# Patient Record
Sex: MALE | Race: WHITE | NOT HISPANIC OR LATINO | Employment: FULL TIME | URBAN - NONMETROPOLITAN AREA
[De-identification: names, ages, dates, MRNs, and addresses within clinical notes are randomized per-mention and may not be internally consistent; named-entity substitution may affect disease eponyms.]

---

## 2017-09-07 ENCOUNTER — OFFICE VISIT (OUTPATIENT)
Dept: FAMILY MEDICINE CLINIC | Facility: CLINIC | Age: 57
End: 2017-09-07

## 2017-09-07 VITALS
HEIGHT: 73 IN | OXYGEN SATURATION: 98 % | RESPIRATION RATE: 16 BRPM | SYSTOLIC BLOOD PRESSURE: 148 MMHG | DIASTOLIC BLOOD PRESSURE: 92 MMHG | HEART RATE: 75 BPM | BODY MASS INDEX: 41.75 KG/M2 | TEMPERATURE: 97.9 F | WEIGHT: 315 LBS

## 2017-09-07 DIAGNOSIS — R03.0 ELEVATED BLOOD PRESSURE READING WITHOUT DIAGNOSIS OF HYPERTENSION: ICD-10-CM

## 2017-09-07 DIAGNOSIS — R10.9 STOMACH PAIN: Primary | ICD-10-CM

## 2017-09-07 DIAGNOSIS — Z28.21 INFLUENZA VACCINATION DECLINED: ICD-10-CM

## 2017-09-07 PROCEDURE — 99203 OFFICE O/P NEW LOW 30 MIN: CPT | Performed by: FAMILY MEDICINE

## 2017-09-07 RX ORDER — SUCRALFATE 1 G/1
1 TABLET ORAL 4 TIMES DAILY
Qty: 40 TABLET | Refills: 1 | Status: SHIPPED | OUTPATIENT
Start: 2017-09-07 | End: 2017-09-23 | Stop reason: SDUPTHER

## 2017-09-07 RX ORDER — OMEPRAZOLE 20 MG/1
20 CAPSULE, DELAYED RELEASE ORAL DAILY
Qty: 14 CAPSULE | Refills: 0 | Status: SHIPPED | OUTPATIENT
Start: 2017-09-07

## 2017-09-07 NOTE — PROGRESS NOTES
Chief Complaint   Patient presents with   • GI Problem        History:  Jarret Bianchi is a 57 y.o. male presents who today for evaluation of the above problems.  PCP currently listed as No Known Provider.   Patient presents with GI issues, that just has started in the past week. Pain is in upper quadrant of abdomen. He feels like certain foods make it worse. He does have some diarrhea with some episodes. Symptoms present since Sunday,now 4 days.  He had cut out greasy foods, fatty foods, sugar foods.  Lost ~40 lb over 6-12 months and then He resumed this diet again over past 3-4 months.  He is a , has cut out a lot of bad foods.  Started working harder.  He started caffeine again, hot dogs/fatty foods and then started having stomach problems again.  Diarrhea x 2-3 days.  This am solid not diarrhea.  Yesterday prominent sweating, prominent Gi issues, cramping.  Did not bother him through night, did not have dinner and this am he feels somewhat better as he has not had breakfast yet.  He has a fullness this am.   Emesis  X 1 Monday pm.  Ate a lot of pizza.  The patient feels fine now. BP is mildly elevated, weight is prominently elevated. He is having intermittent cramping.  He notes abdominal pain starts middle of stomach and moves to RUQ or LUQ.  No Nasuea today. NO food today.  Drinking liquids okay.  He is having belching at times, no sour taste, no GERD symptoms.  No diarrhea in 24 hours.  After further discussion he had a large chocolate milkshake from [employer] on Tuesday and he had prominent cramping and diarrhea yesterday. I discussed if Gastroenteritis was present he can be temporarily lactose intolerant. Bp is up today.  DIscussed weight, discussed HTN.  He has no history.     Jarret Bianchi  has no past medical history on file.    No Known Allergies  History reviewed. No pertinent past medical history.  Past Surgical History:   Procedure Laterality Date   • LACERATION REPAIR Right     knee     Family  "History   Problem Relation Age of Onset   • Cancer Mother        No current outpatient prescriptions on file prior to visit.     No current facility-administered medications on file prior to visit.        Family history, surgical history, past medical history, Allergies and meds reviewed with patient today and updated in [facility] EMR.     ROS:  Review of Systems   Constitutional: Positive for fatigue. Negative for activity change, appetite change, chills, diaphoresis and fever.   HENT: Negative for congestion, ear discharge, ear pain, facial swelling, hearing loss, rhinorrhea, sinus pressure, sneezing, sore throat and tinnitus.    Eyes: Negative for pain, discharge, redness and visual disturbance.   Respiratory: Negative for apnea, cough, choking, chest tightness, shortness of breath and wheezing.    Cardiovascular: Negative for chest pain, palpitations and leg swelling.   Gastrointestinal: Positive for abdominal pain, diarrhea, nausea and vomiting. Negative for abdominal distention, anal bleeding, blood in stool, constipation and rectal pain.   Genitourinary: Negative for difficulty urinating, flank pain, frequency, penile pain and urgency.   Musculoskeletal: Negative for arthralgias, back pain, gait problem, joint swelling, myalgias and neck pain.   Skin: Negative for color change, pallor and rash.   Allergic/Immunologic: Negative for environmental allergies and food allergies.   Neurological: Negative for dizziness, seizures, weakness, numbness and headaches.   Hematological: Negative for adenopathy. Does not bruise/bleed easily.   Psychiatric/Behavioral: Negative for agitation, behavioral problems, confusion, hallucinations, self-injury, sleep disturbance and suicidal ideas.       OBJECTIVE:  Vitals:    09/07/17 0725   BP: 148/92   Pulse: 75   Resp: 16   Temp: 97.9 °F (36.6 °C)   SpO2: 98%   Weight: (!) 336 lb (152 kg)   Height: 73\" (185.4 cm)     Physical Exam   Constitutional: He is oriented to person, place, and " time. He appears well-developed and well-nourished.    Morbid obesity w/ central adiposity.   HENT:   Head: Normocephalic and atraumatic.   Right Ear: External ear normal.   Left Ear: External ear normal.   Nose: Nose normal.   Mouth/Throat: Oropharynx is clear and moist.   Eyes: Conjunctivae and EOM are normal. Pupils are equal, round, and reactive to light.   Neck: Normal range of motion. Neck supple. No thyromegaly present.   Cardiovascular: Normal rate, regular rhythm, normal heart sounds and intact distal pulses.    No murmur heard.  Pulmonary/Chest: Effort normal and breath sounds normal. No respiratory distress. He has no wheezes. He exhibits no tenderness.   Abdominal: Soft. Bowel sounds are normal. There is tenderness in the right upper quadrant and epigastric area. There is no rebound and no guarding.   Musculoskeletal: He exhibits no tenderness.        Right hip: He exhibits decreased range of motion. He exhibits normal strength, no tenderness and no bony tenderness.        Left hip: He exhibits decreased range of motion. He exhibits normal strength, no tenderness and no bony tenderness.        Thoracic back: Normal. He exhibits normal range of motion, no tenderness, no pain and no spasm.        Lumbar back: Normal. He exhibits normal range of motion, no tenderness, no pain and no spasm.   Lymphadenopathy:     He has no cervical adenopathy.   Neurological: He is alert and oriented to person, place, and time. No cranial nerve deficit. Coordination normal.   Skin: Skin is warm and dry. No rash noted.   Psychiatric: He has a normal mood and affect. His behavior is normal. Judgment and thought content normal.   Nursing note and vitals reviewed.      Assessment/Plan:  Stomach Pain: Ddx: Resolving Gastroenteritis, IBS, Gastritis, Colitis, less likely food poisoning and or Biliary disease.  Discussed lactose intolerance. Discussed case with patient/family today and feel that most likely dx is gastroenteritis.   These usually run anywhere from 1-5 days depending on severity. Biggest risk factor is dehydration. Gatoraid is best rehydration solution. Encouraged any color but red.  No bloody emesis/diarrhea by history. Mild cramping, mild abdominal pain.  May consider avoiding dairy as able for ~ 1 week as GE can cause temporary lactose intolerance. Discussed to consider UA today.  If no better in 1 week or if worsening recommend followup with me to evaluate this further.  If concerned about dehydration can follow-up with me. Risks/benefits of current and new medications discussed with the patient/family today.  The patient/family are aware if there any side effects they should stop meds and call or return to clinic.  Appropriate F/U discussed. All questions answered to satisfactory state of patient/family.  Should symptoms fail to improve or worsen discussed to call or return to clinic or go to ER. Education handout provided on any new Rx. Avoid dairy x 1 week.   Next step includes RUMATEO US.        Obesity: Federal guidelines recommend that people under the age of 65 should have a BMI of 18.5-24.9 and people age 65 and older should have a BMI of 23-30. Morbid obesity is defined as >100 lb overweight or BMI >40.  The patient BMI is outside the recommended range and we recommended/discussed today to utilize a diet/exercise program to get back into the appropriate range.  Today we encouraged roughly a 1 lb per week weight loss with initial goal of 5% weight loss. The initial step is to document everything that is consumed into a food diary.  Studies have shown that patients can lose up to 2x the weight by keeping track of foods.  We discussed BEE today and discussed reasonable goal to realize weight loss.    · Discussed if eating out for a meal, consider cutting food in half and placing into to-go container.  Individually portion any foods coming into the home based on package.   · Consider using smaller plates.    · Consider  drinking 12 oz of water 30 minutes before meal as way to suppress appetite.   · Cut back on soft drinks/juices.  Discussed 1 can of regular soft drink has roughly 150-170 Calories per day.    · Increase exercise as able. It is recommended to try to exercise minimum 10 minutes 5 days per week.  The goal over the next 2-4 weeks is to walk 30 minutes per day 5 days per week at pace difficult to hold conversation.   · Medications that may provide some benefit to weight loss include metformin, topamax, phentermine, Qsymia, Belviq (lorcaserin), Contrave (Buproprion/naltrexone) and a few others for Binge eating.  Also discussed some of the FAD diets and recommended general portion reduction instead of special dietary changes.  · Apps that may be of benefit include [employer] Pal, Lose it.  · Healthier choices included fruits/grains/nuts instead of process food.    Elevated Blood pressure: History of HTN no. Elevated BP today above goal. Discussed some readily available medications/supplements can increase BP: NSAIDS, caffeine, decongestants, nicotine.  Additionally stress, illegal drugs/substances, Pain and anxiety/white coat syndrome can increase BP.  Close monitoring encouraged.  Recommended keep log and bring back to office in next few weeks to see if this is isolated or a trend.   · Monitor for next 1-2 weeks.    · If you have a home cuff check daily when you awaken. Best if seated 10-15 minutes back supported.    · If >140/90 rest back supported, arms/legs uncrossed and repeat reading 15 minutes later.  · If no cuff, check at pharmacy or in our office.   · F/u in 2-4 weeks for repeat BP evaluation.  Bring in BP cuff and or log to compare.  · Goal <140/90. If BP is worsening call or f/u in the office.     Orders Placed Today:  Jarret was seen today for gi problem.    Diagnoses and all orders for this visit:    Stomach pain  -     omeprazole (PRILOSEC) 20 MG capsule; Take 1 capsule by mouth Daily.  -     sucralfate  (CARAFATE) 1 g tablet; Take 1 tablet by mouth 4 (Four) Times a Day.    Influenza vaccination declined    Elevated blood pressure reading without diagnosis of hypertension        Risks/benefits of current and new medications discussed with the patient and or family today.  The patient/family are aware and accept that if there any side effects they should call or return to clinic as soon as possible.  Appropriate F/U discussed for topics addressed today. All questions were answered to the satisfactory state of patient/family.  Should symptoms fail to improve or worsen they agree to call or return to clinic or to go to the ER. Education handouts were offered on any new Rx if requested.  Discussed the importance of following up with any needed screening tests/labs/specialist appointments and any requested follow-up recommended by me today.  Importance of maintaining follow-up discussed and patient accepts that missed appointments can delay diagnosis and potentially lead to worsening of conditions.    An After Visit Summary was printed and given to the patient at discharge.  Follow-up: Return in about 1 week (around 9/14/2017), or if symptoms worsen or fail to improve.         Baldev Birmingham M.D. 9/7/2017

## 2017-09-07 NOTE — PATIENT INSTRUCTIONS
"DASH Eating Plan  DASH stands for \"Dietary Approaches to Stop Hypertension.\" The DASH eating plan is a healthy eating plan that has been shown to reduce high blood pressure (hypertension). Additional health benefits may include reducing the risk of type 2 diabetes mellitus, heart disease, and stroke. The DASH eating plan may also help with weight loss.  WHAT DO I NEED TO KNOW ABOUT THE DASH EATING PLAN?  For the DASH eating plan, you will follow these general guidelines:  · Choose foods with less than 150 milligrams of sodium per serving (as listed on the food label).  · Use salt-free seasonings or herbs instead of table salt or sea salt.  · Check with your health care provider or pharmacist before using salt substitutes.  · Eat lower-sodium products. These are often labeled as \"low-sodium\" or \"no salt added.\"  · Eat fresh foods. Avoid eating a lot of canned foods.  · Eat more vegetables, fruits, and low-fat dairy products.  · Choose whole grains. Look for the word \"whole\" as the first word in the ingredient list.  · Choose fish and skinless chicken or turkey more often than red meat. Limit fish, poultry, and meat to 6 oz (170 g) each day.  · Limit sweets, desserts, sugars, and sugary drinks.  · Choose heart-healthy fats.  · Eat more home-cooked food and less restaurant, buffet, and fast food.  · Limit fried foods.  · Do not ortega foods. Cook foods using methods such as baking, boiling, grilling, and broiling instead.  · When eating at a restaurant, ask that your food be prepared with less salt, or no salt if possible.  WHAT FOODS CAN I EAT?  Seek help from a dietitian for individual calorie needs.  Grains  Whole grain or whole wheat bread. Brown rice. Whole grain or whole wheat pasta. Quinoa, bulgur, and whole grain cereals. Low-sodium cereals. Corn or whole wheat flour tortillas. Whole grain cornbread. Whole grain crackers. Low-sodium crackers.  Vegetables  Fresh or frozen vegetables (raw, steamed, roasted, or " grilled). Low-sodium or reduced-sodium tomato and vegetable juices. Low-sodium or reduced-sodium tomato sauce and paste. Low-sodium or reduced-sodium canned vegetables.   Fruits  All fresh, canned (in natural juice), or frozen fruits.  Meat and Other Protein Products  Ground beef (85% or leaner), grass-fed beef, or beef trimmed of fat. Skinless chicken or turkey. Ground chicken or turkey. Pork trimmed of fat. All fish and seafood. Eggs. Dried beans, peas, or lentils. Unsalted nuts and seeds. Unsalted canned beans.  Dairy  Low-fat dairy products, such as skim or 1% milk, 2% or reduced-fat cheeses, low-fat ricotta or cottage cheese, or plain low-fat yogurt. Low-sodium or reduced-sodium cheeses.  Fats and Oils  Tub margarines without trans fats. Light or reduced-fat mayonnaise and salad dressings (reduced sodium). Avocado. Safflower, olive, or canola oils. Natural peanut or almond butter.  Other  Unsalted popcorn and pretzels.  The items listed above may not be a complete list of recommended foods or beverages. Contact your dietitian for more options.  WHAT FOODS ARE NOT RECOMMENDED?  Grains  White bread. White pasta. White rice. Refined cornbread. Bagels and croissants. Crackers that contain trans fat.  Vegetables  Creamed or fried vegetables. Vegetables in a cheese sauce. Regular canned vegetables. Regular canned tomato sauce and paste. Regular tomato and vegetable juices.  Fruits  Canned fruit in light or heavy syrup. Fruit juice.  Meat and Other Protein Products  Fatty cuts of meat. Ribs, chicken wings, diego, sausage, bologna, salami, chitterlings, fatback, hot dogs, bratwurst, and packaged luncheon meats. Salted nuts and seeds. Canned beans with salt.  Dairy  Whole or 2% milk, cream, half-and-half, and cream cheese. Whole-fat or sweetened yogurt. Full-fat cheeses or blue cheese. Nondairy creamers and whipped toppings. Processed cheese, cheese spreads, or cheese curds.  Condiments  Onion and garlic salt, seasoned  salt, table salt, and sea salt. Canned and packaged gravies. Worcestershire sauce. Tartar sauce. Barbecue sauce. Teriyaki sauce. Soy sauce, including reduced sodium. Steak sauce. Fish sauce. Oyster sauce. Cocktail sauce. Horseradish. Ketchup and mustard. Meat flavorings and tenderizers. Bouillon cubes. Hot sauce. Tabasco sauce. Marinades. Taco seasonings. Relishes.  Fats and Oils  Butter, stick margarine, lard, shortening, ghee, and diego fat. Coconut, palm kernel, or palm oils. Regular salad dressings.  Other  Pickles and olives. Salted popcorn and pretzels.  The items listed above may not be a complete list of foods and beverages to avoid. Contact your dietitian for more information.  WHERE CAN I FIND MORE INFORMATION?  National Heart, Lung, and Blood [place]: www.nhlbi.nih.gov/health/health-topics/topics/dash/     This information is not intended to replace advice given to you by your health care provider. Make sure you discuss any questions you have with your health care provider.     Document Released: 12/06/2012 Document Revised: 04/10/2017 Document Reviewed: 10/22/2014  [employer] Interactive Patient Education ©2017 [employer]  Diet for Irritable Bowel Syndrome  When you have irritable bowel syndrome (IBS), the foods you eat and your eating habits are very important. IBS may cause various symptoms, such as abdominal pain, constipation, or diarrhea. Choosing the right foods can help ease discomfort caused by these symptoms. Work with your health care provider and dietitian to find the best eating plan to help control your symptoms.  WHAT GENERAL GUIDELINES DO I NEED TO FOLLOW?  · Keep a food diary. This will help you identify foods that cause symptoms. Write down:    What you eat and when.    What symptoms you have.    When symptoms occur in relation to your meals.  · Avoid foods that cause symptoms. Talk with your dietitian about other ways to get the same nutrients that are in these foods.  · Eat more  foods that contain fiber. Take a fiber supplement if directed by your dietitian.  · Eat your meals slowly, in a relaxed setting.  · Aim to eat 5-6 small meals per day. Do not skip meals.  · Drink enough fluids to keep your urine clear or pale yellow.  · Ask your health care provider if you should take an over-the-counter probiotic during flare-ups to help restore healthy gut bacteria.  · If you have cramping or diarrhea, try making your meals low in fat and high in carbohydrates. Examples of carbohydrates are pasta, rice, whole grain breads and cereals, fruits, and vegetables.  · If dairy products cause your symptoms to flare up, try eating less of them. You might be able to handle yogurt better than other dairy products because it contains bacteria that help with digestion.  WHAT FOODS ARE NOT RECOMMENDED?  The following are some foods and drinks that may worsen your symptoms:  · Fatty foods, such as French fries.  · Milk products, such as cheese or ice cream.  · Chocolate.  · Alcohol.  · Products with caffeine, such as coffee.  · Carbonated drinks, such as soda.  The items listed above may not be a complete list of foods and beverages to avoid. Contact your dietitian for more information.  WHAT FOODS ARE GOOD SOURCES OF FIBER?  Your health care provider or dietitian may recommend that you eat more foods that contain fiber. Fiber can help reduce constipation and other IBS symptoms. Add foods with fiber to your diet a little at a time so that your body can get used to them. Too much fiber at once might cause gas and swelling of your abdomen. The following are some foods that are good sources of fiber:  · Apples.  · Peaches.  · Pears.  · Berries.  · Figs.  · Broccoli (raw).  · Cabbage.  · Carrots.  · Raw peas.  · Kidney beans.  · Lima beans.  · Whole grain bread.  · Whole grain cereal.  FOR MORE INFORMATION   International Foundation for Functional Gastrointestinal Disorders: www.iffgd.org  National [place] of  Diabetes and Digestive and Kidney Diseases: www.niddk.nih.gov/health-information/health-topics/digestive-diseases/ibs/Pages/facts.aspx     This information is not intended to replace advice given to you by your health care provider. Make sure you discuss any questions you have with your health care provider.     Document Released: 03/09/2005 Document Revised: 01/08/2016 Document Reviewed: 03/20/2015  [employer] Interactive Patient Education ©2017 [employer]

## 2017-09-23 DIAGNOSIS — R10.9 STOMACH PAIN: ICD-10-CM

## 2017-09-25 RX ORDER — SUCRALFATE 1 G/1
TABLET ORAL
Qty: 40 TABLET | Refills: 0 | Status: SHIPPED | OUTPATIENT
Start: 2017-09-25

## 2019-11-21 ENCOUNTER — OFFICE VISIT (OUTPATIENT)
Dept: FAMILY MEDICINE CLINIC | Facility: CLINIC | Age: 59
End: 2019-11-21

## 2019-11-21 VITALS
RESPIRATION RATE: 18 BRPM | OXYGEN SATURATION: 98 % | HEIGHT: 72 IN | DIASTOLIC BLOOD PRESSURE: 80 MMHG | SYSTOLIC BLOOD PRESSURE: 132 MMHG | TEMPERATURE: 97.9 F | HEART RATE: 82 BPM | WEIGHT: 315 LBS | BODY MASS INDEX: 42.66 KG/M2

## 2019-11-21 DIAGNOSIS — B34.9 VIRAL ILLNESS: Primary | ICD-10-CM

## 2019-11-21 PROCEDURE — 99213 OFFICE O/P EST LOW 20 MIN: CPT | Performed by: FAMILY MEDICINE

## 2019-11-21 NOTE — PROGRESS NOTES
Subjective cc: nausea   Jarret Bianchi is a 59 y.o. male who presents with not feeling well. He went ot grandparents day this AM, when he got back to work he broke up in sweats and started getting nausea. No diarrhea.  He is having dry mouth and then salvia. No chest pain, he feels like his breathing rate was increased and shallow. Others ate same food - no one else with symptoms that he is aware of.     His granddaughter was sick recently - had nausea and headache and stayed with them recently - 24 hours ago.     Nausea   This is a new problem. The current episode started today. The problem occurs constantly. The problem has been unchanged. Associated symptoms include nausea. Pertinent negatives include no abdominal pain, anorexia, arthralgias, change in bowel habit, chest pain, chills, congestion, coughing, diaphoresis, fatigue, fever, headaches, joint swelling, myalgias, neck pain, numbness, rash, sore throat, swollen glands, urinary symptoms, vertigo, visual change, vomiting or weakness. Nothing aggravates the symptoms. He has tried nothing for the symptoms. The treatment provided no relief.        The following portions of the patient's history were reviewed and updated as appropriate: allergies, current medications, past family history, past medical history, past social history, past surgical history and problem list.        Review of Systems   Constitutional: Positive for appetite change. Negative for activity change, chills, diaphoresis, fatigue, fever and unexpected weight change.   HENT: Negative for congestion and sore throat.    Respiratory: Negative for cough.    Cardiovascular: Negative for chest pain.   Gastrointestinal: Positive for nausea. Negative for abdominal pain, anorexia, blood in stool, change in bowel habit, constipation and vomiting.   Musculoskeletal: Negative for arthralgias, joint swelling, myalgias and neck pain.   Skin: Negative for rash.   Neurological: Negative for vertigo,  "weakness, numbness and headaches.   All other systems reviewed and are negative.      Objective   Blood pressure 132/80, pulse 82, temperature 97.9 °F (36.6 °C), temperature source Oral, resp. rate 18, height 182.9 cm (72\"), weight (!) 159 kg (351 lb 3.2 oz), SpO2 98 %.  Physical Exam   Constitutional: He is oriented to person, place, and time. He appears well-developed and well-nourished. He is active and cooperative. He appears toxic. He does not have a sickly appearance. No distress.   HENT:   Head: Normocephalic and atraumatic.   Right Ear: External ear normal.   Left Ear: External ear normal.   Nose: Nose normal.   Mouth/Throat: Oropharynx is clear and moist. No oropharyngeal exudate.   Eyes: Conjunctivae and EOM are normal. Right eye exhibits no discharge. Left eye exhibits no discharge.   Neck: Normal range of motion. Neck supple. No tracheal deviation present. No thyromegaly present.   Cardiovascular: Normal rate, regular rhythm, normal heart sounds and intact distal pulses.   No murmur heard.  Pulmonary/Chest: Effort normal and breath sounds normal. No stridor. No respiratory distress.   Abdominal: Soft. Bowel sounds are normal. He exhibits no distension. There is no tenderness.   Lymphadenopathy:     He has no cervical adenopathy.   Neurological: He is alert and oriented to person, place, and time.   Skin: Skin is warm and dry. He is not diaphoretic.   Psychiatric: He has a normal mood and affect. His behavior is normal. Judgment and thought content normal.   Nursing note and vitals reviewed.      Assessment/Plan   Problems Addressed this Visit     None      Visit Diagnoses     Viral illness    -  Primary        PLAN:     #1 viral illnes: new, sudden in onset, pt with recent sick exposure with similar symptoms, vitals are stable, advised on conservative meausres, advised on warning signs, retunr if not improving           This document has been electronically signed by Jazmín Magallanes MD on November " 21, 2019 2:52 PM

## 2021-11-12 ENCOUNTER — CLINICAL SUPPORT (OUTPATIENT)
Dept: FAMILY MEDICINE CLINIC | Facility: CLINIC | Age: 61
End: 2021-11-12

## 2021-11-12 PROCEDURE — 90686 IIV4 VACC NO PRSV 0.5 ML IM: CPT | Performed by: NURSE PRACTITIONER

## 2021-11-12 PROCEDURE — 90471 IMMUNIZATION ADMIN: CPT | Performed by: NURSE PRACTITIONER

## 2024-06-05 ENCOUNTER — HOME HEALTH ADMISSION (OUTPATIENT)
Dept: HOME HEALTH SERVICES | Facility: HOME HEALTHCARE | Age: 64
End: 2024-06-05
Payer: COMMERCIAL